# Patient Record
Sex: MALE | Race: WHITE | NOT HISPANIC OR LATINO | URBAN - NONMETROPOLITAN AREA
[De-identification: names, ages, dates, MRNs, and addresses within clinical notes are randomized per-mention and may not be internally consistent; named-entity substitution may affect disease eponyms.]

---

## 2019-04-17 ENCOUNTER — IMPORTED ENCOUNTER (OUTPATIENT)
Dept: URBAN - NONMETROPOLITAN AREA CLINIC 1 | Facility: CLINIC | Age: 69
End: 2019-04-17

## 2019-04-17 PROBLEM — H02.831: Noted: 2019-04-17

## 2019-04-17 PROBLEM — H11.053: Noted: 2019-10-24

## 2019-04-17 PROBLEM — H11.053: Noted: 2021-03-17

## 2019-04-17 PROBLEM — H52.221: Noted: 2019-10-24

## 2019-04-17 PROBLEM — H52.03: Noted: 2021-03-12

## 2019-04-17 PROBLEM — H11.053: Noted: 2019-04-17

## 2019-04-17 PROBLEM — H25.813: Noted: 2021-03-17

## 2019-04-17 PROBLEM — H02.834: Noted: 2019-04-17

## 2019-04-17 PROBLEM — H52.4: Noted: 2019-04-17

## 2019-04-17 PROBLEM — H52.03: Noted: 2019-04-17

## 2019-04-17 PROBLEM — H25.813: Noted: 2019-04-17

## 2019-04-17 PROBLEM — H52.03: Noted: 2019-10-24

## 2019-04-17 PROBLEM — H52.221: Noted: 2019-04-17

## 2019-04-17 PROBLEM — H52.221: Noted: 2021-03-12

## 2019-04-17 PROBLEM — H52.4: Noted: 2021-03-12

## 2019-04-17 PROBLEM — H52.4: Noted: 2019-10-24

## 2019-04-17 PROBLEM — H25.813: Noted: 2019-10-24

## 2019-04-17 PROCEDURE — 92015 DETERMINE REFRACTIVE STATE: CPT

## 2019-04-17 PROCEDURE — 92014 COMPRE OPH EXAM EST PT 1/>: CPT

## 2019-04-17 NOTE — PATIENT DISCUSSION
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 -  The risk of retinal detachment in patients with PVDs was discussed with the patient and the warning signs of retinal detachment were carefully reviewed with the patient. -  The patient was warned to return to the office or contact the ophthalmologist on call immediately if they experience signs of retinal detachment or changes in vision noted from today.  -  Monitor PRNDermatochalasis OU-  Discussed findings w/ pt today-  No superior field of vision complaint noted at this time-  Continue to monitor PRNCR Scar OD-  Discussed findings w/ pt today-  Stable from previous notes-  Will monitor for changes PRN; 's Notes: MR 4/17/2019DFE 4/17/2019Optos - for scar

## 2019-05-24 ENCOUNTER — IMPORTED ENCOUNTER (OUTPATIENT)
Dept: URBAN - NONMETROPOLITAN AREA CLINIC 1 | Facility: CLINIC | Age: 69
End: 2019-05-24

## 2019-05-24 NOTE — PATIENT DISCUSSION
Rx Check-  discussed findings w/patient-  trial framed results today-  new spectacle Rx issued-  continue to monitor as scheduled or prn; 's Notes: MR 4/17/2019DFE 4/17/2019Optos - for scar

## 2019-10-24 ENCOUNTER — IMPORTED ENCOUNTER (OUTPATIENT)
Dept: URBAN - NONMETROPOLITAN AREA CLINIC 1 | Facility: CLINIC | Age: 69
End: 2019-10-24

## 2019-10-24 PROCEDURE — 92014 COMPRE OPH EXAM EST PT 1/>: CPT

## 2019-10-24 NOTE — PATIENT DISCUSSION
Cataracts OU - Discussed diagnosis in detail with patient- No treatment indicated at this time- Recommend UV protection - monitor 6 month Complete w/BAT; 's Notes: MR 4/17/2019DFE 10/24/2019Optos - for scar

## 2021-03-12 ENCOUNTER — IMPORTED ENCOUNTER (OUTPATIENT)
Dept: URBAN - NONMETROPOLITAN AREA CLINIC 1 | Facility: CLINIC | Age: 71
End: 2021-03-12

## 2021-03-12 PROCEDURE — 92015 DETERMINE REFRACTIVE STATE: CPT

## 2021-03-12 PROCEDURE — 92014 COMPRE OPH EXAM EST PT 1/>: CPT

## 2021-03-12 NOTE — PATIENT DISCUSSION
Compound Hyperopic Astigmatism OU w/Presbyopia-  discussed findings w/patient-  new spectacle Rx issued-  monitor yearly or prn Pterygium Excision OD w/ MMC Graft -  Discussed findings w/patient-  Continue Maxitrol radha PRN OU-  continue Refresh Advanced Optive at least BID OU-  Continue to monitorCombined Cataracts OU-  Discussed findings w/ pt today-  No treatment indicated at this time -  UV protection recommended-  Monitor as scheduled or prnPVD-  Discussed findings of exam in detail with the patient. -  The risk of retinal detachment in patients with PVDs was discussed with the patient and the warning signs of retinal detachment were carefully reviewed with the patient. -  The patient was warned to return to the office or contact the ophthalmologist on call immediately if they experience signs of retinal detachment or changes in vision noted from today.  -  Monitor as scheduled or prnDermatochalasis OU-  Discussed findings w/ pt today-  No superior field of vision complaint noted at this time-  Continue to monitor [facility] Scar OD-  Discussed findings w/ pt today-  Stable from previous notes-  Will monitor for changes PRN; 's Notes: MR 3/12/2021DFE 3/12/2021

## 2021-06-22 ENCOUNTER — IMPORTED ENCOUNTER (OUTPATIENT)
Dept: URBAN - NONMETROPOLITAN AREA CLINIC 1 | Facility: CLINIC | Age: 71
End: 2021-06-22

## 2021-06-22 PROBLEM — H25.813: Noted: 2021-06-22

## 2021-06-22 PROBLEM — H11.053: Noted: 2021-06-22

## 2021-06-22 PROCEDURE — 92014 COMPRE OPH EXAM EST PT 1/>: CPT

## 2021-06-22 NOTE — PATIENT DISCUSSION
Cataract(s)-Visually significant cataract OU .-Cataract(s) causing symptomatic impairment of visual function not correctable with a tolerable change in glasses or contact lenses lighting or non-operative means resulting in specific activity limitations and/or participation restrictions including but not limited to reading viewing television driving or meeting vocational or recreational needs. -Expectation is clearer vision and functional improvement in symptoms as well as reduced glare disability after cataract removal.-Order IOLMaster and OPD today. -Recommend Toric OD /LRI  based on today's OPD testing and lifestyle questionnaire.-All questions were answered regarding surgery including pre and post-op medications appointments activity restrictions and anesthetic usage.-The risks benefits and alternatives and special risk factors for the patient were discussed in detail including but not limited to: bleeding infection retinal detachment vitreous loss problems with the implant and possible need for additional surgery.-Although rare the possibility of complete vision loss was discussed.-The possible need for glasses post-operatively was discussed.-Order medical clearance exam based on history of HBP -Patient elects to proceed with cataract surgery OS . Will schedule at patient's convenience and re-evaluate OD  in the future. Discussed Toric OD and LRI OS. Discussed benefits w/ need for reading glasses.  Discussed Stand/Trad OU and need for bifocals s/p surgery Post op inflammation anticipated disucssed dextenza insertion after surgery.; 's Notes: MR 3/12/2021DFE 3/12/2021

## 2021-07-19 ENCOUNTER — IMPORTED ENCOUNTER (OUTPATIENT)
Dept: URBAN - NONMETROPOLITAN AREA CLINIC 1 | Facility: CLINIC | Age: 71
End: 2021-07-19

## 2021-07-19 PROBLEM — H25.813: Noted: 2021-07-19

## 2021-07-19 PROBLEM — H11.053: Noted: 2021-07-19

## 2021-09-14 ENCOUNTER — IMPORTED ENCOUNTER (OUTPATIENT)
Dept: URBAN - NONMETROPOLITAN AREA CLINIC 1 | Facility: CLINIC | Age: 71
End: 2021-09-14

## 2021-09-14 PROBLEM — H25.811: Noted: 2021-09-14

## 2021-09-14 PROBLEM — H11.053: Noted: 2021-09-14

## 2021-09-14 PROBLEM — Z98.42: Noted: 2021-09-14

## 2021-09-14 PROCEDURE — 99024 POSTOP FOLLOW-UP VISIT: CPT

## 2021-09-14 NOTE — PATIENT DISCUSSION
s/p PC IOL OS LRI/LenSx 9/13/2021-  discussed findings w/patient-  Pt doing well s/p PCIOL. -  Continue post-op gtts according to instruction sheet and sleep with eye shield over eye for 7 nights. -  Avoid bending at the waist lifting anything over 5lbs and dirty or solis environments.-  RTC as scheduled or prn; 's Notes: MR 3/12/2021DFE 3/12/2021

## 2021-09-20 ENCOUNTER — IMPORTED ENCOUNTER (OUTPATIENT)
Dept: URBAN - NONMETROPOLITAN AREA CLINIC 1 | Facility: CLINIC | Age: 71
End: 2021-09-20

## 2021-09-20 PROBLEM — Z01.818: Noted: 2021-09-20

## 2021-09-20 PROBLEM — H25.811: Noted: 2021-09-14

## 2021-09-20 PROBLEM — I10: Noted: 2021-09-20

## 2021-09-20 PROCEDURE — 99024 POSTOP FOLLOW-UP VISIT: CPT

## 2021-09-20 NOTE — PATIENT DISCUSSION
Cataract(s)-Visually significant cataract OD . -Cataract(s) causing symptomatic impairment of visual function not correctable with a tolerable change in glasses or contact lenses lighting or non-operative means resulting in specific activity limitations and/or participation restrictions including but not limited to reading viewing television driving or meeting vocational or recreational needs. -Expectation is clearer vision and functional improvement in symptoms as well as reduced glare disability after cataract removal.-Recommend Toric IOL/Lensx based on previous OPD testing and lifestyle questionnaire.-All questions were answered regarding surgery including pre and post-op medications appointments activity restrictions and anesthetic usage.-The risks benefits and alternatives and special risk factors for the patient were discussed in detail including but not limited to: bleeding infection retinal detachment vitreous loss problems with the implant and possible need for additional surgery.-Although rare the possibility of complete vision loss was discussed.-The need for glasses post-operatively was discussed.-Patient elects to proceed with cataract surgery OD . s/p PCIOL-Pt doing well at 1 week s/p PCIOL. -Continue post-op gtts according to instruction sheet.-Okay to resume usual activites and d/c eye shield.; 's Notes: MR 3/12/2021DFE 3/12/2021

## 2021-09-20 NOTE — PATIENT DISCUSSION
Medical Clearance-Medical clearance done today. -No outstanding concerns that would preclude surgery.-Patient is cleared to proceed with scheduled surgery.-Updated med list today Mirlande Younger Dr's Notes: MR 3/12/2021DFE 3/12/2021

## 2021-10-12 ENCOUNTER — IMPORTED ENCOUNTER (OUTPATIENT)
Dept: URBAN - NONMETROPOLITAN AREA CLINIC 1 | Facility: CLINIC | Age: 71
End: 2021-10-12

## 2021-10-12 PROBLEM — Z98.41: Noted: 2021-10-12

## 2021-10-12 PROCEDURE — 99024 POSTOP FOLLOW-UP VISIT: CPT

## 2021-10-12 NOTE — PATIENT DISCUSSION
s/p PC IOL OD Toric/LenSx 10/11/2021-  discussed findings w/patient-  Pt doing well s/p PCIOL. -  Continue post-op gtts according to instruction sheet and sleep with eye shield over eye for 7 nights. -  Avoid bending at the waist lifting anything over 5lbs and dirty or [employer]is environments.-  RTC as scheduled or prn; 's Notes: MR 3/12/2021DFE 3/12/2021

## 2021-10-15 ENCOUNTER — IMPORTED ENCOUNTER (OUTPATIENT)
Dept: URBAN - NONMETROPOLITAN AREA CLINIC 1 | Facility: CLINIC | Age: 71
End: 2021-10-15

## 2021-10-15 PROCEDURE — 99024 POSTOP FOLLOW-UP VISIT: CPT

## 2021-10-15 NOTE — PATIENT DISCUSSION
s/p PC IOL OD Toric/LenSx 10/11/2021-  discussed findings w/patient-  Pt doing well s/p PCIOL. -  Removed BCL today mild SPK superiorly-  Continue post-op gtts according to instruction sheet and sleep with eye shield over eye for 7 nights. -  Avoid bending at the waist lifting anything over 5lbs and dirty or solis environments.-  RTC as scheduled or prn; 's Notes: MR 3/12/2021DFE 3/12/2021

## 2021-10-19 ENCOUNTER — PREPPED CHART (OUTPATIENT)
Dept: URBAN - NONMETROPOLITAN AREA CLINIC 4 | Facility: CLINIC | Age: 71
End: 2021-10-19

## 2021-10-19 ENCOUNTER — IMPORTED ENCOUNTER (OUTPATIENT)
Dept: URBAN - NONMETROPOLITAN AREA CLINIC 1 | Facility: CLINIC | Age: 71
End: 2021-10-19

## 2021-10-19 PROCEDURE — 99024 POSTOP FOLLOW-UP VISIT: CPT

## 2021-10-19 NOTE — PATIENT DISCUSSION
s/p PC IOL OD Toric/LenSx 10/11/2021-  discussed findings w/patient-  Pt doing well s/p PCIOL. -  Continue post-op gtts according to instruction sheet and sleep with eye shield over eye for 7 nights. -  Avoid bending at the waist lifting anything over 5lbs and dirty or solis environments.-  RTC 3 mo DFE Or prn; 's Notes: MR 3/12/2021DFE 3/12/2021

## 2022-03-24 ASSESSMENT — VISUAL ACUITY
OD_SC: 20/25+1
OS_SC: 20/25-1

## 2022-03-29 ENCOUNTER — FOLLOW UP (OUTPATIENT)
Dept: URBAN - NONMETROPOLITAN AREA CLINIC 4 | Facility: CLINIC | Age: 72
End: 2022-03-29

## 2022-03-29 DIAGNOSIS — H02.834: ICD-10-CM

## 2022-03-29 DIAGNOSIS — H02.831: ICD-10-CM

## 2022-03-29 DIAGNOSIS — H02.413: ICD-10-CM

## 2022-03-29 PROCEDURE — 99213 OFFICE O/P EST LOW 20 MIN: CPT

## 2022-03-29 ASSESSMENT — VISUAL ACUITY
OS_SC: 20/50
OD_SC: 20/25
OS_SC: 20/25
OD_SC: 20/50

## 2022-03-29 ASSESSMENT — TONOMETRY: OD_IOP_MMHG: 14

## 2022-03-29 NOTE — PATIENT DISCUSSION
(Surgical) Visually significant with reversible VF defect and patient desires superior blepharoplasty. Risks and benefits have been discussed with the patient.

## 2022-04-09 ASSESSMENT — VISUAL ACUITY
OD_GLARE: 20/40
OD_CC: 20/30
OS_CC: 20/25-1
OS_CC: 20/20
OU_SC: 20/20
OS_CC: 20/25
OU_CC: 20/20
OD_CC: 20/20
OS_CC: 20/25-
OD_CC: 20/50
OD_CC: 20/25+2
OS_CC: 20/40
OD_SC: 20/20
OS_GLARE: 20/40
OD_PH: 20/25
OD_PAM: 20/30
OU_CC: J1+
OS_CC: 20/20
OD_SC: 20/200
OD_CC: 20/20
OD_PH: 20/20-1
OS_AM: 20/30
OS_PH: 20/25
OD_SC: 20/20
OS_GLARE: 20/30
OS_CC: 20/80
OS_SC: 20/30+2
OD_CC: 20/60
OS_SC: 20/25
OD_GLARE: 20/40
OS_SC: 20/20
OD_PH: 20/25
OU_SC: 20/20
OD_GLARE: 20/40
OD_PAM: 20/70
OS_CC: 20/20
OS_SC: 20/20
OS_GLARE: 20/40
OD_SC: 20/20-

## 2022-04-09 ASSESSMENT — TONOMETRY
OS_IOP_MMHG: 15
OS_IOP_MMHG: 20
OD_IOP_MMHG: 19
OS_IOP_MMHG: 17
OD_IOP_MMHG: 18
OD_IOP_MMHG: 16
OS_IOP_MMHG: 18
OD_IOP_MMHG: 19
OS_IOP_MMHG: 15
OS_IOP_MMHG: 20
OD_IOP_MMHG: 20
OD_IOP_MMHG: 18
OS_IOP_MMHG: 20
OD_IOP_MMHG: 18

## 2022-09-28 ENCOUNTER — CONSULTATION/EVALUATION (OUTPATIENT)
Dept: RURAL CLINIC 1 | Facility: CLINIC | Age: 72
End: 2022-09-28

## 2022-09-28 DIAGNOSIS — H02.413: ICD-10-CM

## 2022-09-28 PROCEDURE — 99214 OFFICE O/P EST MOD 30 MIN: CPT

## 2022-09-28 ASSESSMENT — VISUAL ACUITY
OS_SC: 20/25
OD_SC: 20/25

## 2022-09-28 NOTE — PATIENT DISCUSSION
Discussed no blood thinners, baby aspirin, fish oil, or MTV for 3 weeks prior to the procedure. States hes only on a aspirin [id number] 13 OU TBUT12 secs OU (-) LAG OU Patient to have Photos and Schirmers done at [facility] *Pt unable to do this after the time of Thanksgiving and states he will not be able to do until March or April of 2023*Pt states he may be moving and wants to know if we can refer him elsewhere, advised him we could do that if he wishes to.

## 2022-09-28 NOTE — PATIENT DISCUSSION
The upper eyelid being in a lower than normal position of the upper eyelid was explained to the patient.

## 2022-10-05 ENCOUNTER — DIAGNOSTICS ONLY (OUTPATIENT)
Dept: URBAN - NONMETROPOLITAN AREA CLINIC 4 | Facility: CLINIC | Age: 72
End: 2022-10-05

## 2022-10-05 DIAGNOSIS — H02.413: ICD-10-CM

## 2022-10-05 DIAGNOSIS — H26.493: ICD-10-CM

## 2022-10-05 PROCEDURE — 92082 INTERMEDIATE VISUAL FIELD XM: CPT

## 2022-10-05 PROCEDURE — 92285 EXTERNAL OCULAR PHOTOGRAPHY: CPT

## 2022-10-05 NOTE — PATIENT DISCUSSION
Discussed no blood thinners, baby aspirin, fish oil, or MTV for 3 weeks prior to the procedure. States hes only on a aspirin [id number] 13 OU TBUT12 secs OU (-) LAG OU Patient to have Photos and Schirmers done at [facility] *Pt unable to do this after the time of Thanksgiving and states he will not be able to do until March or April of 2023*Pt states he may be moving and wants to know if we can refer him elsewhere, advised him we could do that if he wishes to.